# Patient Record
Sex: MALE | Race: WHITE | NOT HISPANIC OR LATINO | Employment: FULL TIME | URBAN - METROPOLITAN AREA
[De-identification: names, ages, dates, MRNs, and addresses within clinical notes are randomized per-mention and may not be internally consistent; named-entity substitution may affect disease eponyms.]

---

## 2017-07-21 ENCOUNTER — OUTSIDE PLACE OF SERVICE (OUTPATIENT)
Dept: ADMINISTRATIVE | Facility: OTHER | Age: 52
End: 2017-07-21
Payer: COMMERCIAL

## 2017-07-21 PROCEDURE — 99221 1ST HOSP IP/OBS SF/LOW 40: CPT | Mod: ,,, | Performed by: THORACIC SURGERY (CARDIOTHORACIC VASCULAR SURGERY)

## 2017-07-25 ENCOUNTER — OUTSIDE PLACE OF SERVICE (OUTPATIENT)
Dept: ADMINISTRATIVE | Facility: OTHER | Age: 52
End: 2017-07-25
Payer: COMMERCIAL

## 2017-07-25 PROCEDURE — 33533 CABG ARTERIAL SINGLE: CPT | Mod: ,,, | Performed by: THORACIC SURGERY (CARDIOTHORACIC VASCULAR SURGERY)

## 2017-08-16 ENCOUNTER — OFFICE VISIT (OUTPATIENT)
Dept: VASCULAR SURGERY | Facility: CLINIC | Age: 52
End: 2017-08-16
Payer: COMMERCIAL

## 2017-08-16 VITALS — HEIGHT: 70 IN | WEIGHT: 206.38 LBS | BODY MASS INDEX: 29.54 KG/M2

## 2017-08-16 DIAGNOSIS — Z95.1 S/P CABG (CORONARY ARTERY BYPASS GRAFT): Primary | ICD-10-CM

## 2017-08-16 PROCEDURE — 99999 PR PBB SHADOW E&M-EST. PATIENT-LVL II: CPT | Mod: PBBFAC,,, | Performed by: THORACIC SURGERY (CARDIOTHORACIC VASCULAR SURGERY)

## 2017-08-16 PROCEDURE — 99024 POSTOP FOLLOW-UP VISIT: CPT | Mod: S$GLB,,, | Performed by: THORACIC SURGERY (CARDIOTHORACIC VASCULAR SURGERY)

## 2017-08-16 RX ORDER — LISINOPRIL 5 MG/1
TABLET ORAL
Refills: 6 | COMMUNITY
Start: 2017-06-08 | End: 2022-06-08 | Stop reason: CLARIF

## 2017-08-16 RX ORDER — ISOSORBIDE MONONITRATE 30 MG/1
30 TABLET, EXTENDED RELEASE ORAL EVERY MORNING
COMMUNITY
Start: 2017-07-31

## 2017-08-16 RX ORDER — METOPROLOL SUCCINATE 50 MG/1
TABLET, EXTENDED RELEASE ORAL
Refills: 6 | COMMUNITY
Start: 2017-06-08 | End: 2022-06-08 | Stop reason: CLARIF

## 2017-08-16 RX ORDER — NITROGLYCERIN 0.4 MG/1
0.4 TABLET SUBLINGUAL EVERY 5 MIN PRN
COMMUNITY
Start: 2015-03-10

## 2017-08-16 RX ORDER — HYDROCODONE BITARTRATE AND ACETAMINOPHEN 5; 325 MG/1; MG/1
1 TABLET ORAL EVERY 12 HOURS PRN
Qty: 30 TABLET | Refills: 0
Start: 2017-08-16 | End: 2022-06-08 | Stop reason: CLARIF

## 2017-08-16 RX ORDER — QUETIAPINE 200 MG/1
200 TABLET, FILM COATED, EXTENDED RELEASE ORAL NIGHTLY
Refills: 2 | COMMUNITY
Start: 2017-07-17

## 2017-08-16 RX ORDER — ATORVASTATIN CALCIUM 40 MG/1
40 TABLET, FILM COATED ORAL
COMMUNITY
Start: 2016-07-20 | End: 2022-06-08 | Stop reason: CLARIF

## 2017-08-16 RX ORDER — ASPIRIN 325 MG
325 TABLET ORAL
COMMUNITY
Start: 2017-07-31 | End: 2022-06-08 | Stop reason: CLARIF

## 2017-08-16 RX ORDER — FERROUS GLUCONATE 324(37.5)
324 TABLET ORAL
COMMUNITY
Start: 2017-07-31 | End: 2022-06-08 | Stop reason: CLARIF

## 2017-08-16 RX ORDER — FUROSEMIDE 40 MG/1
40 TABLET ORAL DAILY PRN
Status: ON HOLD | COMMUNITY
Start: 2017-07-31 | End: 2022-06-10 | Stop reason: HOSPADM

## 2017-08-28 ENCOUNTER — PATIENT OUTREACH (OUTPATIENT)
Dept: ADMINISTRATIVE | Facility: HOSPITAL | Age: 52
End: 2017-08-28

## 2017-08-28 NOTE — PROGRESS NOTES
Attempted to contact pt to schedule a physical. Pt has not been seen in our office over 12 months. No answer. Left message for pt to return my call. Letter sent.

## 2017-08-28 NOTE — LETTER
August 28, 2017    Jerry Jaramillo  [address] [facility]  [place] [address]           [facility] [address]  [facility] [address]  Phone: [phone] Dear Jerry Jaramillo    In the spirit of maintaining your good health, our system indicates that you have not been seen in the office in over 12 months and due for a visit.     Our system indicates that you are due for the following:   Health Maintenance Due   Topic Date Due    TETANUS VACCINE  03/28/1982    Lipid Panel  12/11/2016    Influenza Vaccine  08/01/2017     Guanaco Seaman MD would like you to schedule an appointment for an annual exam at your earliest convenience. If you have completed any of these health maintenance requirements at an outside facility, please contact our office so we may update your health record.    If your PRIMARY CARE PHYSICIAN has changed please contact your insurance company to reflect the correct physician.    If you have any issues or need assistance in scheduling this appointment, please call the number below.     CORY Gaxiola  Care Coordination Department  [facility]  [phone]

## 2017-09-27 ENCOUNTER — OFFICE VISIT (OUTPATIENT)
Dept: VASCULAR SURGERY | Facility: CLINIC | Age: 52
End: 2017-09-27
Payer: COMMERCIAL

## 2017-09-27 VITALS
WEIGHT: 213 LBS | SYSTOLIC BLOOD PRESSURE: 117 MMHG | HEART RATE: 86 BPM | HEIGHT: 70 IN | DIASTOLIC BLOOD PRESSURE: 82 MMHG | BODY MASS INDEX: 30.49 KG/M2

## 2017-09-27 DIAGNOSIS — L76.82 PAIN AT SURGICAL INCISION: ICD-10-CM

## 2017-09-27 DIAGNOSIS — Z95.1 S/P CABG (CORONARY ARTERY BYPASS GRAFT): Primary | ICD-10-CM

## 2017-09-27 PROCEDURE — 99999 PR PBB SHADOW E&M-EST. PATIENT-LVL III: CPT | Mod: PBBFAC,,, | Performed by: THORACIC SURGERY (CARDIOTHORACIC VASCULAR SURGERY)

## 2017-09-27 PROCEDURE — 99024 POSTOP FOLLOW-UP VISIT: CPT | Mod: S$GLB,,, | Performed by: THORACIC SURGERY (CARDIOTHORACIC VASCULAR SURGERY)

## 2017-09-27 RX ORDER — TRAZODONE HYDROCHLORIDE 100 MG/1
100 TABLET ORAL NIGHTLY
COMMUNITY

## 2017-09-27 NOTE — PROGRESS NOTES
The patient comes in today having some issues status post coronary artery bypass   grafting.  He described some difficulty with swallowing and passing foods from   apparently his esophagus down into his stomach.  This has been a problem that   has become somewhat worse over the last few weeks.  He was concerned about the   sternum and its healing process itself and whether or not that could affect this   issue.  He does have some trouble with breathing too when he lies down.  His   medicines are noted.  They are part of the EPIC records.  Problem list reviewed.    He takes daily aspirin.    PHYSICAL EXAMINATION:  The surgical wound is clean and dry.  There is no   evidence of infection.  His breath sounds are clear.  I do not detect any major   movement with coughing and deep breathing of the sternal bone itself, which   appears to be intact.    At this point, we will check a CTA of the neck and chest to see if there is any   sort of compression on the airway or the esophagus.  Based on the scan, we will   make further recommendations.      ASHVIN  dd: 09/27/2017 12:49:45 (CDT)  td: 09/28/2017 03:10:00 (CDT)  Doc ID   #[id number]  Job ID #[id number]    CC:

## 2017-10-09 ENCOUNTER — TELEPHONE (OUTPATIENT)
Dept: VASCULAR SURGERY | Facility: CLINIC | Age: 52
End: 2017-10-09

## 2017-10-09 NOTE — TELEPHONE ENCOUNTER
Pt informed of Ct results and Dr. Vazquez's recommendation to follow up with PCP (Jurgen or Gastro ([facility]) for sx. Pt states understanding.

## 2021-12-21 ENCOUNTER — PATIENT MESSAGE (OUTPATIENT)
Dept: NEUROLOGY | Facility: CLINIC | Age: 56
End: 2021-12-21
Payer: OTHER GOVERNMENT

## 2022-02-28 ENCOUNTER — PATIENT MESSAGE (OUTPATIENT)
Dept: PSYCHIATRY | Facility: CLINIC | Age: 57
End: 2022-02-28
Payer: OTHER GOVERNMENT

## 2022-06-08 PROBLEM — R07.9 CHEST PAIN WITH HIGH RISK FOR CARDIAC ETIOLOGY: Status: ACTIVE | Noted: 2022-06-08

## 2022-06-10 PROBLEM — K20.90 ESOPHAGITIS: Status: ACTIVE | Noted: 2022-06-10

## 2022-06-24 ENCOUNTER — PATIENT MESSAGE (OUTPATIENT)
Dept: PSYCHIATRY | Facility: CLINIC | Age: 57
End: 2022-06-24
Payer: OTHER GOVERNMENT

## 2022-06-28 ENCOUNTER — TELEPHONE (OUTPATIENT)
Dept: GASTROENTEROLOGY | Facility: CLINIC | Age: 57
End: 2022-06-28
Payer: OTHER GOVERNMENT

## 2022-12-01 ENCOUNTER — PATIENT MESSAGE (OUTPATIENT)
Dept: PSYCHIATRY | Facility: CLINIC | Age: 57
End: 2022-12-01
Payer: OTHER GOVERNMENT

## 2023-02-20 ENCOUNTER — PATIENT MESSAGE (OUTPATIENT)
Dept: PSYCHIATRY | Facility: CLINIC | Age: 58
End: 2023-02-20
Payer: OTHER GOVERNMENT

## 2023-07-21 ENCOUNTER — PATIENT MESSAGE (OUTPATIENT)
Dept: PSYCHIATRY | Facility: CLINIC | Age: 58
End: 2023-07-21
Payer: OTHER GOVERNMENT

## 2024-01-22 ENCOUNTER — PATIENT MESSAGE (OUTPATIENT)
Dept: PSYCHIATRY | Facility: CLINIC | Age: 59
End: 2024-01-22
Payer: OTHER GOVERNMENT

## 2024-05-02 ENCOUNTER — PATIENT MESSAGE (OUTPATIENT)
Dept: PSYCHIATRY | Facility: CLINIC | Age: 59
End: 2024-05-02
Payer: OTHER GOVERNMENT

## 2024-07-25 ENCOUNTER — PATIENT MESSAGE (OUTPATIENT)
Dept: PSYCHIATRY | Facility: CLINIC | Age: 59
End: 2024-07-25
Payer: OTHER GOVERNMENT

## 2024-08-05 ENCOUNTER — PATIENT MESSAGE (OUTPATIENT)
Dept: PSYCHIATRY | Facility: CLINIC | Age: 59
End: 2024-08-05
Payer: OTHER GOVERNMENT

## 2024-12-16 ENCOUNTER — PATIENT MESSAGE (OUTPATIENT)
Dept: PSYCHIATRY | Facility: CLINIC | Age: 59
End: 2024-12-16
Payer: OTHER GOVERNMENT